# Patient Record
Sex: FEMALE | Race: WHITE | HISPANIC OR LATINO | Employment: FULL TIME | URBAN - NONMETROPOLITAN AREA
[De-identification: names, ages, dates, MRNs, and addresses within clinical notes are randomized per-mention and may not be internally consistent; named-entity substitution may affect disease eponyms.]

---

## 2022-09-18 ENCOUNTER — HOSPITAL ENCOUNTER (EMERGENCY)
Facility: HOSPITAL | Age: 47
Discharge: HOME | End: 2022-09-18
Attending: EMERGENCY MEDICINE | Admitting: EMERGENCY MEDICINE
Payer: COMMERCIAL

## 2022-09-18 VITALS
SYSTOLIC BLOOD PRESSURE: 149 MMHG | OXYGEN SATURATION: 98 % | WEIGHT: 157 LBS | HEART RATE: 112 BPM | TEMPERATURE: 97.8 F | RESPIRATION RATE: 20 BRPM | DIASTOLIC BLOOD PRESSURE: 73 MMHG | BODY MASS INDEX: 27.82 KG/M2 | HEIGHT: 63 IN

## 2022-09-18 DIAGNOSIS — B34.9 VIRAL SYNDROME: Primary | ICD-10-CM

## 2022-09-18 LAB
FLUAV RNA RESP QL NAA+PROBE: NEGATIVE
FLUBV RNA RESP QL NAA+PROBE: NEGATIVE
RSV RNA RESP QL NAA+PROBE: NEGATIVE
SARS-COV-2 RNA RESP QL NAA+PROBE: NEGATIVE

## 2022-09-18 PROCEDURE — 96372 THER/PROPH/DIAG INJ SC/IM: CPT

## 2022-09-18 PROCEDURE — 99283 EMERGENCY DEPT VISIT LOW MDM: CPT

## 2022-09-18 PROCEDURE — 99284 EMERGENCY DEPT VISIT MOD MDM: CPT | Performed by: PHYSICIAN ASSISTANT

## 2022-09-18 PROCEDURE — 0241U HB NFCT DS VIR RESP RNA 4 TRGT: CPT | Performed by: PHYSICIAN ASSISTANT

## 2022-09-18 RX ORDER — FLUTICASONE PROPIONATE 50 MCG
1 SPRAY, SUSPENSION (ML) NASAL DAILY
Status: DISCONTINUED | OUTPATIENT
Start: 2022-09-19 | End: 2022-09-18

## 2022-09-18 RX ORDER — FLUTICASONE PROPIONATE 50 MCG
1 SPRAY, SUSPENSION (ML) NASAL DAILY
Qty: 16 G | Refills: 0 | Status: SHIPPED | OUTPATIENT
Start: 2022-09-18

## 2022-09-18 RX ORDER — FLUTICASONE PROPIONATE 50 MCG
1 SPRAY, SUSPENSION (ML) NASAL DAILY
Status: DISCONTINUED | OUTPATIENT
Start: 2022-09-18 | End: 2022-09-18 | Stop reason: HOSPADM

## 2022-09-18 RX ORDER — ALBUTEROL SULFATE 90 UG/1
2 AEROSOL, METERED RESPIRATORY (INHALATION) ONCE
Status: COMPLETED | OUTPATIENT
Start: 2022-09-18 | End: 2022-09-18

## 2022-09-18 RX ORDER — ALBUTEROL SULFATE 90 UG/1
2 AEROSOL, METERED RESPIRATORY (INHALATION) EVERY 6 HOURS PRN
Qty: 8.5 G | Refills: 0 | Status: SHIPPED | OUTPATIENT
Start: 2022-09-18

## 2022-09-18 RX ORDER — METHYLPREDNISOLONE 4 MG/1
TABLET ORAL
Qty: 21 TABLET | Refills: 0 | Status: SHIPPED | OUTPATIENT
Start: 2022-09-18

## 2022-09-18 RX ORDER — DEXAMETHASONE SODIUM PHOSPHATE 4 MG/ML
10 INJECTION, SOLUTION INTRA-ARTICULAR; INTRALESIONAL; INTRAMUSCULAR; INTRAVENOUS; SOFT TISSUE ONCE
Status: COMPLETED | OUTPATIENT
Start: 2022-09-18 | End: 2022-09-18

## 2022-09-18 RX ADMIN — DEXAMETHASONE SODIUM PHOSPHATE 10 MG: 4 INJECTION, SOLUTION INTRAMUSCULAR; INTRAVENOUS at 19:24

## 2022-09-18 RX ADMIN — ALBUTEROL SULFATE 2 PUFF: 90 AEROSOL, METERED RESPIRATORY (INHALATION) at 19:23

## 2022-09-18 RX ADMIN — FLUTICASONE PROPIONATE 1 SPRAY: 50 SPRAY, METERED NASAL at 20:00

## 2022-09-18 NOTE — ED PROVIDER NOTES
History  Chief Complaint   Patient presents with    Flu Symptoms     Sore throat, runny nose and headache     The patient is a 64-year-old female who presents emergency department today with a chief complaint of sore throat, nasal congestion, rhinorrhea, headache, cough x3 days  The patient states that she has intermittent wheezing over the past year  Patient states that she has not taken anything prior to arrival   Patient did have COVID approximately 2 years ago  Patient concerned she may be ill again with COVID  Patient does no longer have her inhaler home this was prescribed with previous illnesses  Flu Symptoms  Presenting symptoms: cough and rhinorrhea    Cough:     Cough characteristics:  Non-productive    Sputum characteristics:  Nondescript    Onset quality:  Gradual    Timing:  Constant    Progression:  Worsening  Rhinorrhea:     Quality:  Clear    Severity:  Moderate    Timing:  Constant    Progression:  Worsening  Severity:  Moderate  Onset quality:  Gradual  Progression:  Worsening  Chronicity:  New  Relieved by:  None tried  Worsened by:  Nothing  Ineffective treatments:  None tried  Associated symptoms: chills and nasal congestion    Associated symptoms: no decrease in physical activity, no ear pain, no mental status change and no syncope        None       Past Medical History:   Diagnosis Date    Disease of thyroid gland        History reviewed  No pertinent surgical history  History reviewed  No pertinent family history  I have reviewed and agree with the history as documented  E-Cigarette/Vaping     E-Cigarette/Vaping Substances     Social History     Tobacco Use    Smoking status: Never Smoker    Smokeless tobacco: Never Used   Substance Use Topics    Alcohol use: Never    Drug use: Never       Review of Systems   Constitutional: Positive for chills  HENT: Positive for congestion and rhinorrhea  Negative for ear pain  Respiratory: Positive for cough      All other systems reviewed and are negative  Physical Exam  Physical Exam  Vitals and nursing note reviewed  Constitutional:       General: She is not in acute distress  Appearance: She is well-developed  HENT:      Head: Normocephalic and atraumatic  Right Ear: Ear canal and external ear normal  Tympanic membrane is bulging  Tympanic membrane is not perforated or erythematous  Left Ear: Ear canal and external ear normal  Tympanic membrane is bulging  Tympanic membrane is not perforated or erythematous  Nose: Congestion and rhinorrhea present  Mouth/Throat:      Mouth: Mucous membranes are moist    Eyes:      Extraocular Movements: Extraocular movements intact  Conjunctiva/sclera: Conjunctivae normal       Pupils: Pupils are equal, round, and reactive to light  Cardiovascular:      Rate and Rhythm: Normal rate and regular rhythm  Pulses: Normal pulses  Heart sounds: No murmur heard  Pulmonary:      Effort: Pulmonary effort is normal  No respiratory distress  Breath sounds: Normal breath sounds  Abdominal:      Palpations: Abdomen is soft  Tenderness: There is no abdominal tenderness  There is no right CVA tenderness or left CVA tenderness  Musculoskeletal:      Cervical back: Normal range of motion and neck supple  Right lower leg: No edema  Left lower leg: No edema  Skin:     General: Skin is warm and dry  Capillary Refill: Capillary refill takes less than 2 seconds  Neurological:      General: No focal deficit present  Mental Status: She is alert and oriented to person, place, and time  GCS: GCS eye subscore is 4  GCS verbal subscore is 5  GCS motor subscore is 6  Gait: Gait is intact           Vital Signs  ED Triage Vitals [09/18/22 1851]   Temperature Pulse Respirations Blood Pressure SpO2   97 8 °F (36 6 °C) (!) 112 20 149/73 98 %      Temp src Heart Rate Source Patient Position - Orthostatic VS BP Location FiO2 (%)   -- Monitor Sitting -- --      Pain Score       5           Vitals:    09/18/22 1851   BP: 149/73   Pulse: (!) 112   Patient Position - Orthostatic VS: Sitting         Visual Acuity      ED Medications  Medications   dexamethasone (DECADRON) injection 10 mg (10 mg Intramuscular Given 9/18/22 1924)   albuterol (PROVENTIL HFA,VENTOLIN HFA) inhaler 2 puff (2 puffs Inhalation Given 9/18/22 1923)       Diagnostic Studies  Results Reviewed     Procedure Component Value Units Date/Time    FLU/RSV/COVID - if FLU/RSV clinically relevant [[id number]] Collected: 09/18/22 1929    Lab Status: No result Specimen: Nares from Nose                  No orders to display              Procedures  Procedures         ED Course                               SBIRT 20yo+    Flowsheet Row Most Recent Value   SBIRT (23 yo +)    In order to provide better care to our patients, we are screening all of our patients for alcohol and drug use  Would it be okay to ask you these screening questions? Yes Filed at: 09/18/2022 2641   Initial Alcohol Screen: US AUDIT-C     1  How often do you have a drink containing alcohol? 0 Filed at: 09/18/2022 1852   2  How many drinks containing alcohol do you have on a typical day you are drinking? 0 Filed at: 09/18/2022 1852   3a  Male UNDER 65: How often do you have five or more drinks on one occasion? 0 Filed at: 09/18/2022 1852   3b  FEMALE Any Age, or MALE 65+: How often do you have 4 or more drinks on one occassion? 0 Filed at: 09/18/2022 1852   Audit-C Score 0 Filed at: 09/18/2022 2037   DEEPIKA: How many times in the past year have you    Used an illegal drug or used a prescription medication for non-medical reasons?  Never Filed at: 09/18/2022 4526                    MDM  Number of Diagnoses or Management Options     Amount and/or Complexity of Data Reviewed  Clinical lab tests: ordered  Decide to obtain previous medical records or to obtain history from someone other than the patient: yes  Review and summarize past medical records: yes  Independent visualization of images, tracings, or specimens: yes    Risk of Complications, Morbidity, and/or Mortality  Presenting problems: low  Diagnostic procedures: low  Management options: low    Patient Progress  Patient progress: stable      Disposition  Final diagnoses:   Viral syndrome     Time reflects when diagnosis was documented in both MDM as applicable and the Disposition within this note     Time User Action Codes Description Comment    9/18/2022  7:01 PM Sharan Seip Lynn [B34 9] Viral syndrome       ED Disposition     ED Disposition   Discharge    Condition   Stable    Date/Time   Sun Sep 18, 2022  7:01 PM    [address] discharge to home/self care  Follow-up Information    None         Patient's Medications   Discharge Prescriptions    ALBUTEROL (PROAIR HFA) 90 MCG/ACT INHALER    Inhale 2 puffs every 6 (six) hours as needed for wheezing       Start Date: 9/18/2022 End Date: --       Order Dose: 2 puffs       Quantity: 8 5 g    Refills: 0    FLUTICASONE (FLONASE) 50 MCG/ACT NASAL SPRAY    1 spray into each nostril daily       Start Date: 9/18/2022 End Date: --       Order Dose: 1 spray       Quantity: 16 g    Refills: 0    METHYLPREDNISOLONE 4 MG TABLET THERAPY PACK    Use as directed on package       Start Date: 9/18/2022 End Date: --       Order Dose: --       Quantity: 21 tablet    Refills: 0       No discharge procedures on file      PDMP Review     None          ED Provider  Electronically Signed by           Nahum Vance PA-C  09/18/22 1929

## 2024-06-26 ENCOUNTER — OFFICE VISIT (OUTPATIENT)
Age: 49
End: 2024-06-26
Payer: COMMERCIAL

## 2024-06-26 ENCOUNTER — PREP FOR PROCEDURE (OUTPATIENT)
Age: 49
End: 2024-06-26

## 2024-06-26 VITALS
HEART RATE: 102 BPM | OXYGEN SATURATION: 98 % | HEIGHT: 63 IN | SYSTOLIC BLOOD PRESSURE: 128 MMHG | WEIGHT: 161 LBS | DIASTOLIC BLOOD PRESSURE: 80 MMHG | BODY MASS INDEX: 28.53 KG/M2

## 2024-06-26 DIAGNOSIS — Z12.11 SCREENING FOR COLON CANCER: ICD-10-CM

## 2024-06-26 DIAGNOSIS — K21.9 GASTROESOPHAGEAL REFLUX DISEASE, UNSPECIFIED WHETHER ESOPHAGITIS PRESENT: Primary | ICD-10-CM

## 2024-06-26 PROCEDURE — 99204 OFFICE O/P NEW MOD 45 MIN: CPT | Performed by: PHYSICIAN ASSISTANT

## 2024-06-26 RX ORDER — PANTOPRAZOLE SODIUM 40 MG/1
40 TABLET, DELAYED RELEASE ORAL DAILY
Qty: 30 TABLET | Refills: 1 | Status: SHIPPED | OUTPATIENT
Start: 2024-06-26 | End: 2024-08-25

## 2024-06-26 NOTE — PROGRESS NOTES
[facility] - Outpatient Consultation  Patricia Reyes 48 y.o. female MRN: [id number]  Encounter: [id number]          ASSESSMENT AND PLAN:      1. Gastroesophageal reflux disease    Patient reports struggling with significant heartburn, reflux, and a globus sensation x several weeks.    Will plan for EGD to investigate.  Will begin a course of Pantoprazole 40mg po daily x 8 weeks for GERD.  GERD modifications reviewed.  Decrease coffee consumption.    2. Screening for colon cancer    Patient has never had a colonoscopy. No family history of colon cancer.  Will plan for colonoscopy at the same time as the EGD to investigate.     ______________________________________________________________________    HPI:  Patient is a pleasant 48 year old female with a PMH of hyperthyroidism, asthma who presents to the office for a gastrointestinal evaluation.  Patient reports struggling with significant heartburn, reflux, and a globus sensation x several weeks. No melena or rectal bleeding.  No frequent NSAIDs.  She does report significant coffee consumption and has been working on decreasing it. No alcohol.  No prior EGD or colonoscopy. No family history of stomach or colon cancer.  She reports recent thyroid nodules and is following up with Endocrinology.      REVIEW OF SYSTEMS:    CONSTITUTIONAL: Denies any fever, chills, rigors, and weight loss.  HEENT: No earache or tinnitus. Denies hearing loss or visual disturbances.  CARDIOVASCULAR: No chest pain or palpitations.   RESPIRATORY: Denies any cough, hemoptysis, shortness of breath or dyspnea on exertion.  GASTROINTESTINAL: As noted in the History of Present Illness.   GENITOURINARY: No problems with urination. Denies any hematuria or dysuria.  NEUROLOGIC: No dizziness or vertigo, denies headaches.   MUSCULOSKELETAL: Denies any muscle or joint pain.   SKIN: Denies skin rashes or itching.   ENDOCRINE: Denies excessive thirst. Denies intolerance to  "heat or cold.  PSYCHOSOCIAL: Denies depression or anxiety. Denies any recent memory loss.       Historical Information   Past Medical History:   Diagnosis Date    Disease of thyroid gland     Hyperthyroidism      Past Surgical History:   Procedure Laterality Date    BARIATRIC SURGERY      15 years ago    HYSTERECTOMY      2 years ago     Social History   Social History     Substance and Sexual Activity   Alcohol Use Never     Social History     Substance and Sexual Activity   Drug Use Never     Social History     Tobacco Use   Smoking Status Never   Smokeless Tobacco Never     Family History   Problem Relation Age of Onset    Arthritis Mother     Breast cancer Mother     Depression Mother     Hypertension Mother     Kidney disease Mother        Meds/Allergies       Current Outpatient Medications:     albuterol (ProAir HFA) 90 mcg/act inhaler    pantoprazole (PROTONIX) 40 mg tablet    No Known Allergies        Objective     Blood pressure 128/80, pulse 102, height 5' 3\" (1.6 m), weight 73 kg (161 lb), SpO2 98%. Body mass index is 28.52 kg/m².        PHYSICAL EXAM:      General Appearance:   Alert, cooperative, no distress   HEENT:   Normocephalic, atraumatic, anicteric.     Neck:  Supple, symmetrical, trachea midline   Lungs:   Clear to auscultation bilaterally; no rales, rhonchi or wheezing; respirations unlabored    Heart::   Regular rate and rhythm; no murmur, rub, or gallop.   Abdomen:   Soft, non-tender, non-distended; normal bowel sounds; no masses, no organomegaly    Genitalia:   Deferred    Rectal:   Deferred    Extremities:  No cyanosis, clubbing or edema    Pulses:  2+ and symmetric    Skin:  No jaundice, rashes, or lesions    Lymph nodes:  No palpable cervical lymphadenopathy        Lab Results:   No visits with results within 1 Day(s) from this visit.   Latest known visit with results is:   Admission on 09/18/2022, Discharged on 09/18/2022   Component Date Value    SARS-CoV-2 09/18/2022 Negative     " INFLUENZA A PCR 09/18/2022 Negative     INFLUENZA B PCR 09/18/2022 Negative     RSV PCR 09/18/2022 Negative          Radiology Results:   US thyroid    Result Date: 6/20/2024  Narrative: History: Abnormal thyroid function tests Ultrasound of the thyroid gland was performed Comparison Studies: 8/24/2021 Findings: Right thyroid lobe: 4.5 x 1.2 x 1.0 cm Left thyroid lobe: 4.3 x 1.1 x 1.3 cm Thyroid gland echotexture: Mildly heterogeneous Thyroid gland vascularity on color Doppler: Normal Nodules Right Lobe: Nodule 1 Right mid nodule measuring 0.5 x 0.3 x 0.4 cm Comparison: None Composition: Spongiform Echotexture: Isoechoic to hypoechoic Margin: Regular Echogenic foci: None BISI Category: Very low Nodules Left Lobe: Nodule 1 Left lower nodule measuring 0.7 x 0.3 x 0.5 cm Comparison: None Composition: Solid Echotexture: Hypoechoic Margin: Regular Echogenic foci: None BISI Category: Intermediate.   Nodule 1 Left mid to lower nodule measuring 0.7 x 0.3 x 0.5 cm Comparison: Probably present previously although not measured discretely. Composition: Solid hypoechoic Echotexture: Hypoechoic Margin: Regular Echogenic foci: None BISI Category: Intermediate Patchy nonuniform echotexture in the mid to lower pole the left lobe of the thyroid, with possible subtle 0.8 x 0.3 x 0.4 cm nodule versus heterogeneous tissue. Similar findings were present on the prior study, although a discrete nodule was not measured. Nodules Isthmus: None Other findings: None    Impression: IMPRESSION: Small right thyroid nodule, very low suspicion imaging pattern. Small left thyroid nodule, intermediate suspicion pattern. Patchy nonuniform echotexture in the left mid to lower pole, with subtle nodule versus heterogeneous tissue. Visually, this is similar to the prior study. Consider follow-up surveillance imaging in one year. Sonographic pattern and FNA recommendations are based on American Thyroid Association (BISI) guidelines for assessment of thyroid  nodules, as agreed upon by multidisciplinary departments at [facility]. Sonographic pattern Benign pattern (0% risk): no biopsy Very low suspicion pattern (<3% risk): biopsy if > or = 2 cm (or ultrasound observation) Low suspicion pattern (5-10% risk): biopsy if > or = 1.5 cm Intermediate suspicion pattern (10-20% risk): biopsy if > or = 1 cm High suspicion pattern (>70-90% risk): biopsy if > or = 1 cm (for nodules <1 cm, biopsy could be considered if technically feasible) Workstation:[id number]

## 2024-06-26 NOTE — PATIENT INSTRUCTIONS
Scheduled date of EGD/colonoscopy (as of today): 7/3/24  Physician performing EGD/colonoscopy: [place]  Location of EGD/colonoscopy: [facility]  Desired bowel prep reviewed with patient: Miralax  Instructions reviewed with patient by: Emilee CHOU  Clearances:

## 2024-06-26 NOTE — H&P (VIEW-ONLY)
[facility] - Outpatient Consultation  Patricia Reyes 48 y.o. female MRN: [id number]  Encounter: [id number]          ASSESSMENT AND PLAN:      1. Gastroesophageal reflux disease    Patient reports struggling with significant heartburn, reflux, and a globus sensation x several weeks.    Will plan for EGD to investigate.  Will begin a course of Pantoprazole 40mg po daily x 8 weeks for GERD.  GERD modifications reviewed.  Decrease coffee consumption.    2. Screening for colon cancer    Patient has never had a colonoscopy. No family history of colon cancer.  Will plan for colonoscopy at the same time as the EGD to investigate.     ______________________________________________________________________    HPI:  Patient is a pleasant 48 year old female with a PMH of hyperthyroidism, asthma who presents to the office for a gastrointestinal evaluation.  Patient reports struggling with significant heartburn, reflux, and a globus sensation x several weeks. No melena or rectal bleeding.  No frequent NSAIDs.  She does report significant coffee consumption and has been working on decreasing it. No alcohol.  No prior EGD or colonoscopy. No family history of stomach or colon cancer.  She reports recent thyroid nodules and is following up with Endocrinology.      REVIEW OF SYSTEMS:    CONSTITUTIONAL: Denies any fever, chills, rigors, and weight loss.  HEENT: No earache or tinnitus. Denies hearing loss or visual disturbances.  CARDIOVASCULAR: No chest pain or palpitations.   RESPIRATORY: Denies any cough, hemoptysis, shortness of breath or dyspnea on exertion.  GASTROINTESTINAL: As noted in the History of Present Illness.   GENITOURINARY: No problems with urination. Denies any hematuria or dysuria.  NEUROLOGIC: No dizziness or vertigo, denies headaches.   MUSCULOSKELETAL: Denies any muscle or joint pain.   SKIN: Denies skin rashes or itching.   ENDOCRINE: Denies excessive thirst. Denies intolerance to  "heat or cold.  PSYCHOSOCIAL: Denies depression or anxiety. Denies any recent memory loss.       Historical Information   Past Medical History:   Diagnosis Date    Disease of thyroid gland     Hyperthyroidism      Past Surgical History:   Procedure Laterality Date    BARIATRIC SURGERY      15 years ago    HYSTERECTOMY      2 years ago     Social History   Social History     Substance and Sexual Activity   Alcohol Use Never     Social History     Substance and Sexual Activity   Drug Use Never     Social History     Tobacco Use   Smoking Status Never   Smokeless Tobacco Never     Family History   Problem Relation Age of Onset    Arthritis Mother     Breast cancer Mother     Depression Mother     Hypertension Mother     Kidney disease Mother        Meds/Allergies       Current Outpatient Medications:     albuterol (ProAir HFA) 90 mcg/act inhaler    pantoprazole (PROTONIX) 40 mg tablet    No Known Allergies        Objective     Blood pressure 128/80, pulse 102, height 5' 3\" (1.6 m), weight 73 kg (161 lb), SpO2 98%. Body mass index is 28.52 kg/m².        PHYSICAL EXAM:      General Appearance:   Alert, cooperative, no distress   HEENT:   Normocephalic, atraumatic, anicteric.     Neck:  Supple, symmetrical, trachea midline   Lungs:   Clear to auscultation bilaterally; no rales, rhonchi or wheezing; respirations unlabored    Heart::   Regular rate and rhythm; no murmur, rub, or gallop.   Abdomen:   Soft, non-tender, non-distended; normal bowel sounds; no masses, no organomegaly    Genitalia:   Deferred    Rectal:   Deferred    Extremities:  No cyanosis, clubbing or edema    Pulses:  2+ and symmetric    Skin:  No jaundice, rashes, or lesions    Lymph nodes:  No palpable cervical lymphadenopathy        Lab Results:   No visits with results within 1 Day(s) from this visit.   Latest known visit with results is:   Admission on 09/18/2022, Discharged on 09/18/2022   Component Date Value    SARS-CoV-2 09/18/2022 Negative     " INFLUENZA A PCR 09/18/2022 Negative     INFLUENZA B PCR 09/18/2022 Negative     RSV PCR 09/18/2022 Negative          Radiology Results:   US thyroid    Result Date: 6/20/2024  Narrative: History: Abnormal thyroid function tests Ultrasound of the thyroid gland was performed Comparison Studies: 8/24/2021 Findings: Right thyroid lobe: 4.5 x 1.2 x 1.0 cm Left thyroid lobe: 4.3 x 1.1 x 1.3 cm Thyroid gland echotexture: Mildly heterogeneous Thyroid gland vascularity on color Doppler: Normal Nodules Right Lobe: Nodule 1 Right mid nodule measuring 0.5 x 0.3 x 0.4 cm Comparison: None Composition: Spongiform Echotexture: Isoechoic to hypoechoic Margin: Regular Echogenic foci: None BISI Category: Very low Nodules Left Lobe: Nodule 1 Left lower nodule measuring 0.7 x 0.3 x 0.5 cm Comparison: None Composition: Solid Echotexture: Hypoechoic Margin: Regular Echogenic foci: None BISI Category: Intermediate.   Nodule 1 Left mid to lower nodule measuring 0.7 x 0.3 x 0.5 cm Comparison: Probably present previously although not measured discretely. Composition: Solid hypoechoic Echotexture: Hypoechoic Margin: Regular Echogenic foci: None BISI Category: Intermediate Patchy nonuniform echotexture in the mid to lower pole the left lobe of the thyroid, with possible subtle 0.8 x 0.3 x 0.4 cm nodule versus heterogeneous tissue. Similar findings were present on the prior study, although a discrete nodule was not measured. Nodules Isthmus: None Other findings: None    Impression: IMPRESSION: Small right thyroid nodule, very low suspicion imaging pattern. Small left thyroid nodule, intermediate suspicion pattern. Patchy nonuniform echotexture in the left mid to lower pole, with subtle nodule versus heterogeneous tissue. Visually, this is similar to the prior study. Consider follow-up surveillance imaging in one year. Sonographic pattern and FNA recommendations are based on American Thyroid Association (BISI) guidelines for assessment of thyroid  nodules, as agreed upon by multidisciplinary departments at [facility]. Sonographic pattern Benign pattern (0% risk): no biopsy Very low suspicion pattern (<3% risk): biopsy if > or = 2 cm (or ultrasound observation) Low suspicion pattern (5-10% risk): biopsy if > or = 1.5 cm Intermediate suspicion pattern (10-20% risk): biopsy if > or = 1 cm High suspicion pattern (>70-90% risk): biopsy if > or = 1 cm (for nodules <1 cm, biopsy could be considered if technically feasible) Workstation:[id number]

## 2024-07-03 ENCOUNTER — ANESTHESIA EVENT (OUTPATIENT)
Dept: GASTROENTEROLOGY | Facility: HOSPITAL | Age: 49
End: 2024-07-03

## 2024-07-03 ENCOUNTER — HOSPITAL ENCOUNTER (OUTPATIENT)
Dept: GASTROENTEROLOGY | Facility: HOSPITAL | Age: 49
Setting detail: OUTPATIENT SURGERY
Discharge: HOME | End: 2024-07-03
Payer: COMMERCIAL

## 2024-07-03 ENCOUNTER — ANESTHESIA (OUTPATIENT)
Dept: GASTROENTEROLOGY | Facility: HOSPITAL | Age: 49
End: 2024-07-03

## 2024-07-03 VITALS
HEIGHT: 63 IN | TEMPERATURE: 97.7 F | SYSTOLIC BLOOD PRESSURE: 105 MMHG | OXYGEN SATURATION: 99 % | RESPIRATION RATE: 16 BRPM | HEART RATE: 80 BPM | WEIGHT: 154.32 LBS | DIASTOLIC BLOOD PRESSURE: 57 MMHG | BODY MASS INDEX: 27.34 KG/M2

## 2024-07-03 DIAGNOSIS — K21.9 GASTROESOPHAGEAL REFLUX DISEASE, UNSPECIFIED WHETHER ESOPHAGITIS PRESENT: ICD-10-CM

## 2024-07-03 DIAGNOSIS — Z12.11 SCREENING FOR COLON CANCER: ICD-10-CM

## 2024-07-03 PROCEDURE — 43235 EGD DIAGNOSTIC BRUSH WASH: CPT | Performed by: INTERNAL MEDICINE

## 2024-07-03 PROCEDURE — 88305 TISSUE EXAM BY PATHOLOGIST: CPT | Performed by: PATHOLOGY

## 2024-07-03 PROCEDURE — 45385 COLONOSCOPY W/LESION REMOVAL: CPT | Performed by: INTERNAL MEDICINE

## 2024-07-03 RX ORDER — SODIUM CHLORIDE, SODIUM LACTATE, POTASSIUM CHLORIDE, CALCIUM CHLORIDE 600; 310; 30; 20 MG/100ML; MG/100ML; MG/100ML; MG/100ML
INJECTION, SOLUTION INTRAVENOUS CONTINUOUS PRN
Status: DISCONTINUED | OUTPATIENT
Start: 2024-07-03 | End: 2024-07-03

## 2024-07-03 RX ORDER — LIDOCAINE HYDROCHLORIDE 20 MG/ML
INJECTION, SOLUTION EPIDURAL; INFILTRATION; INTRACAUDAL; PERINEURAL AS NEEDED
Status: DISCONTINUED | OUTPATIENT
Start: 2024-07-03 | End: 2024-07-03

## 2024-07-03 RX ORDER — PROPOFOL 10 MG/ML
INJECTION, EMULSION INTRAVENOUS AS NEEDED
Status: DISCONTINUED | OUTPATIENT
Start: 2024-07-03 | End: 2024-07-03

## 2024-07-03 RX ADMIN — PROPOFOL 50 MG: 10 INJECTION, EMULSION INTRAVENOUS at 09:09

## 2024-07-03 RX ADMIN — SODIUM CHLORIDE, SODIUM LACTATE, POTASSIUM CHLORIDE, AND CALCIUM CHLORIDE: .6; .31; .03; .02 INJECTION, SOLUTION INTRAVENOUS at 08:52

## 2024-07-03 RX ADMIN — LIDOCAINE HYDROCHLORIDE 100 MG: 20 INJECTION, SOLUTION EPIDURAL; INFILTRATION; INTRACAUDAL; PERINEURAL at 08:58

## 2024-07-03 RX ADMIN — PROPOFOL 50 MG: 10 INJECTION, EMULSION INTRAVENOUS at 08:59

## 2024-07-03 RX ADMIN — PROPOFOL 100 MG: 10 INJECTION, EMULSION INTRAVENOUS at 08:58

## 2024-07-03 RX ADMIN — PROPOFOL 30 MG: 10 INJECTION, EMULSION INTRAVENOUS at 09:03

## 2024-07-03 NOTE — ANESTHESIA POSTPROCEDURE EVALUATION
Post-Op Assessment Note    CV Status:  Stable  Pain Score: 0    Pain management: adequate       Mental Status:  Sleepy and arousable   Hydration Status:  Euvolemic   PONV Controlled:  None   Airway Patency:  Patent     Post Op Vitals Reviewed: Yes    No anethesia notable event occurred.    Staff: MALA               BP 97/55 (07/03/24 0918)    Temp 97.7 °F (36.5 °C) (07/03/24 0918)    Pulse 75 (07/03/24 0918)   Resp 16 (07/03/24 0918)    SpO2 100 % (07/03/24 0918)

## 2024-07-03 NOTE — ANESTHESIA PREPROCEDURE EVALUATION
Procedure:  COLONOSCOPY  EGD  Fell this am when getting up to go to the bathroom, patient states she got light headed with suddenly getting up, fell and hit her elbow. Denies hitting her head. Not on blood thinners. Feels like her normal self at this time. No focal neuro deficits on exam.  No cardiac hx. HD stable. Likely occurred in the setting of bowel prep and NPO - will proceed.    Hypothyroid  Asthma - uses inhaler last week. No ED visits for resp distress  2015 bariatric surgery - sleeve  Denies hx of anesthesia complications    High ft4 5/22/24 - +thyroid nodules, feels a lump, sometimes difficulty swallowing    Relevant Problems   No relevant active problems        Physical Exam    Airway    Mallampati score: I  TM Distance: >3 FB  Neck ROM: full     Dental   No notable dental hx     Cardiovascular  Cardiovascular exam normal    Pulmonary  Pulmonary exam normal     Other Findings  post-pubertal.      Anesthesia Plan  ASA Score- 2     Anesthesia Type- IV sedation with anesthesia with ASA Monitors.         Additional Monitors:     Airway Plan:            Plan Factors-Exercise tolerance (METS): >4 METS.    Chart reviewed.   Existing labs reviewed. Patient summary reviewed.    Patient is not a current smoker.              Induction- intravenous.    Postoperative Plan-         Informed Consent- Anesthetic plan and risks discussed with patient.  I personally reviewed this patient with the CRNA. Discussed and agreed on the Anesthesia Plan with the CRNA..

## 2024-07-03 NOTE — INTERVAL H&P NOTE
H&P reviewed. After examining the patient I find no changes in the patients condition since the H&P had been written.    Vitals:    07/03/24 0738   BP: 129/72   Pulse: 77   Resp: 16   Temp: 97.6 °F (36.4 °C)   SpO2: 98%

## 2024-07-10 ENCOUNTER — OFFICE VISIT (OUTPATIENT)
Age: 49
End: 2024-07-10
Payer: COMMERCIAL

## 2024-07-10 VITALS
OXYGEN SATURATION: 96 % | DIASTOLIC BLOOD PRESSURE: 80 MMHG | WEIGHT: 154 LBS | SYSTOLIC BLOOD PRESSURE: 128 MMHG | BODY MASS INDEX: 27.29 KG/M2 | HEIGHT: 63 IN | HEART RATE: 88 BPM

## 2024-07-10 DIAGNOSIS — K21.9 GASTROESOPHAGEAL REFLUX DISEASE, UNSPECIFIED WHETHER ESOPHAGITIS PRESENT: ICD-10-CM

## 2024-07-10 DIAGNOSIS — K21.00 GASTROESOPHAGEAL REFLUX DISEASE WITH ESOPHAGITIS WITHOUT HEMORRHAGE: Primary | ICD-10-CM

## 2024-07-10 PROCEDURE — 99213 OFFICE O/P EST LOW 20 MIN: CPT | Performed by: PHYSICIAN ASSISTANT

## 2024-07-10 RX ORDER — PANTOPRAZOLE SODIUM 40 MG/1
40 TABLET, DELAYED RELEASE ORAL DAILY
Qty: 30 TABLET | Refills: 1 | Status: SHIPPED | OUTPATIENT
Start: 2024-07-10 | End: 2024-09-08

## 2024-07-10 NOTE — PROGRESS NOTES
[facility] - Outpatient Follow-up Note  Patricia Reyes 48 y.o. female MRN: [id number]  Encounter: [id number]          ASSESSMENT AND PLAN:      1. Gastroesophageal reflux disease with esophagitis without hemorrhage    Patient presents for follow up.  She underwent an EGD 7/3 which showed grade B esophagitis and a small sliding hiatal hernia.  She underwent a colonoscopy as well which showed 1 smaller than 5mm adenoma that was removed.  Her symptoms have improved on the Pantoprazole course.    She will continue Pantoprazole 40mg po daily to complete 12 weeks of treatment and then she can come off the medication.  GERD modifications reviewed. Avoidance of GERD trigger foods and avoidance or reclining for at least 3 hours after eating.  Repeat colonoscopy in 7 years.    ______________________________________________________________________    SUBJECTIVE:  Patient is a pleasant 48 year old female who presents to the office for follow up.  Sky heartburn, reflux, and globus sensation has improved on the Pantoprazole.  She feels much better!  She underwent and EGD and colonoscopy as noted above.  No smoking.       REVIEW OF SYSTEMS IS OTHERWISE NEGATIVE.      Historical Information   Past Medical History:   Diagnosis Date    Asthma     post covid    Disease of thyroid gland     Hyperthyroidism      Past Surgical History:   Procedure Laterality Date    BARIATRIC SURGERY      15 years ago    HYSTERECTOMY      2 years ago     Social History   Social History     Substance and Sexual Activity   Alcohol Use Never     Social History     Substance and Sexual Activity   Drug Use Never     Social History     Tobacco Use   Smoking Status Never   Smokeless Tobacco Never     Family History   Problem Relation Age of Onset    Arthritis Mother     Breast cancer Mother     Depression Mother     Hypertension Mother     Kidney disease Mother        Meds/Allergies       Current Outpatient Medications:      "albuterol (ProAir HFA) 90 mcg/act inhaler    pantoprazole (PROTONIX) 40 mg tablet    No Known Allergies        Objective     Height 5' 3\" (1.6 m), weight 69.9 kg (154 lb). Body mass index is 27.28 kg/m².      PHYSICAL EXAM:      General Appearance:   Alert, cooperative, no distress   HEENT:   Normocephalic, atraumatic, anicteric.     Neck:  Supple, symmetrical, trachea midline   Lungs:   Clear to auscultation bilaterally; no rales, rhonchi or wheezing; respirations unlabored    Heart::   Regular rate and rhythm; no murmur, rub, or gallop.   Abdomen:   Soft, non-tender, non-distended; normal bowel sounds; no masses, no organomegaly    Genitalia:   Deferred    Rectal:   Deferred    Extremities:  No cyanosis, clubbing or edema    Pulses:  2+ and symmetric    Skin:  No jaundice, rashes, or lesions    Lymph nodes:  No palpable cervical lymphadenopathy        Lab Results:   No visits with results within 1 Day(s) from this visit.   Latest known visit with results is:   Hospital Outpatient Visit on 07/03/2024   Component Date Value    Case Report 07/03/2024                      Value:Surgical Pathology Report                         Case: [id number]                                  Authorizing Provider:  Ginny Bryson MD           Collected:           07/03/2024 0915              Ordering Location:      [facility]       Received:            07/03/2024 [address] [place] [address] Endoscopy                                                             Pathologist:           Shin Tam MD                                                       Specimen:    Polyp, Colorectal, transverse                                                              Final Diagnosis 07/03/2024                      Value:A. Polyp, Colorectal, transverse:  -Tubular adenoma  -No evidence of high grade dysplasia or malignancy seen.           Note 07/03/2024                      Value:Interpretation performed at " "[facility], [address], [facility] [address]        Additional Information 07/03/2024                      Value:All reported additional testing was performed with appropriately reactive controls.  These tests were developed and their performance characteristics determined by [facility] or appropriate performing facility, though some tests may be performed on tissues which have not been validated for performance characteristics (such as staining performed on alcohol exposed cell blocks and decalcified tissues).  Results should be interpreted with caution and in the context of the patients’ clinical condition. These tests may not be cleared or approved by the U.S. Food and Drug Administration, though the FDA has determined that such clearance or approval is not necessary. These tests are used for clinical purposes and they should not be regarded as investigational or for research. This laboratory has been approved by CLIA 88, designated as a high-complexity laboratory and is qualified to perform these tests.  .      Synoptic Checklist 07/03/2024                      Value:                            COLON/RECTUM POLYP FORM - GI - All Specimens                                                                                     :    Adenoma(s)      Gross Description 07/03/2024                      Value:A. The specimen is received in formalin, labeled with the patient's name and hospital number, and is designated \" polyp, colorectal-transverse\".  The specimen consists of a single tan soft tissue fragment measuring 0.5 cm in greatest dimension.  Entirely submitted. Screened cassette.    Note: The estimated total formalin fixation time based upon information provided by the submitting clinician and the standard processing schedule is under 72 hours.    -Nasim      Clinical Information 07/03/2024                      Value:Cold snare polypectomy         Radiology " Results:   Colonoscopy    Result Date: 7/3/2024  Narrative: Table formatting from the original result was not included.  Pending sale to [facility] [address] [facility] [place] [address] [phone] DATE OF SERVICE: 7/03/24 PHYSICIAN(S): Attending: Ginny Bryson MD Fellow: No Staff Documented INDICATION: Screening for colon cancer POST-OP DIAGNOSIS: See the impression below. HISTORY: Prior colonoscopy: No prior colonoscopy. BOWEL PREPARATION: Miralax/Dulcolax PREPROCEDURE: Informed consent was obtained for the procedure, including sedation. Risks including but not limited to bleeding, infection, perforation, adverse drug reaction and aspiration were explained in detail. Also explained about less than 100% sensitivity with the exam and other alternatives. The patient was placed in the left lateral decubitus position. Procedure: Colonoscopy DETAILS OF PROCEDURE: Patient was taken to the procedure room where a time out was performed to confirm correct patient and correct procedure. The patient underwent monitored anesthesia care, which was administered by an anesthesia professional. The patient's blood pressure, ECG, ETCO2, heart rate, level of consciousness, oxygen and respirations were monitored throughout the procedure. A digital rectal exam was performed. The scope was introduced through the anus and advanced to the cecum. Retroflexion was performed in the rectum. The quality of bowel preparation was evaluated using the [place] Bowel Preparation Scale with scores of: right colon = 2, transverse colon = 2, left colon = 2. The total BBPS score was 6. Bowel prep was adequate. The patient experienced no blood loss. The procedure was not difficult. The patient tolerated the procedure well. There were no apparent adverse events. ANESTHESIA INFORMATION: ASA: II Anesthesia Type: IV Sedation with Anesthesia MEDICATIONS: No administrations occurring from 0852 to 0916 on 07/03/24 FINDINGS: One sessile polyp  measuring smaller than 5 mm in the transverse colon; performed cold snare removal The cecum, ascending colon, hepatic flexure, descending colon, sigmoid colon, rectosigmoid and rectum appeared normal. EVENTS: Procedure Events Event Event Time ENDO CECUM REACHED 7/3/2024  9:05 AM ENDO SCOPE OUT TIME 7/3/2024  9:15 AM SPECIMENS: ID Type Source Tests Collected by Time Destination 1 : transverse Tissue Polyp, Colorectal TISSUE EXAM Ginny Bryson MD 7/3/2024  9:15 AM  EQUIPMENT: Colonoscope -[id number] ENDOCUFF VISION LRG GREEN ID [id number]     Impression: Subcentimeter polyp in the transverse colon was removed with cold snare The cecum, ascending colon, hepatic flexure, descending colon, sigmoid colon, rectosigmoid and rectum appeared normal. RECOMMENDATION: Repeat colonoscopy in 7 years, due: 7/2/2031 Personal history of colon polyps    Ginny Bryson MD     EGD    Result Date: 7/3/2024  Narrative: Table formatting from the original result was not included.  [facility] [address] [address] [phone] DATE OF SERVICE: 7/03/24 PHYSICIAN(S): Attending: Ginny Bryson MD Fellow: No Staff Documented INDICATION: Gastroesophageal reflux disease, unspecified whether esophagitis present POST-OP DIAGNOSIS: See the impression below. PREPROCEDURE: Informed consent was obtained for the procedure, including sedation.  Risks of perforation, hemorrhage, adverse drug reaction and aspiration were discussed. The patient was placed in the left lateral decubitus position. Patient was explained about the risks and benefits of the procedure. Risks including but not limited to bleeding, infection, and perforation were explained in detail. Also explained about less than 100% sensitivity with the exam and other alternatives. PROCEDURE: EGD DETAILS OF PROCEDURE: Patient was taken to the procedure room where a time out was performed to confirm correct patient and correct procedure. The patient underwent  monitored anesthesia care, which was administered by an anesthesia professional. The patient's blood pressure, heart rate, level of consciousness, respirations, oxygen, ECG and ETCO2 were monitored throughout the procedure. The scope was introduced through the mouth and advanced to the second part of the duodenum. Retroflexion was performed in the fundus. The patient experienced no blood loss. The procedure was not difficult. The patient tolerated the procedure well. There were no apparent adverse events. ANESTHESIA INFORMATION: ASA: II Anesthesia Type: IV Sedation with Anesthesia MEDICATIONS: No administrations occurring from 0852 to 0901 on 07/03/24 FINDINGS: Mild grade B esophagitis with mucosal breaks measuring 5 mm or more not continuous between folds, covering less than 75% of the circumference appearing edematous and erythematous in the lower third of the esophagus Small sliding hiatal hernia (type I hiatal hernia) - GE junction 36 cm from the incisors, diaphragmatic impression 38 cm from the incisors The 1st part of the duodenum and 2nd part of the duodenum appeared normal. SPECIMENS: * No specimens in log *     Impression: Grade B esophagitis in the lower third of the esophagus Small type I hiatal hernia The 1st part of the duodenum and 2nd part of the duodenum appeared normal. RECOMMENDATION: Await pathology results Proceed with colonoscopy   Ginny Bryson MD     US thyroid    Result Date: 6/20/2024  Narrative: History: Abnormal thyroid function tests Ultrasound of the thyroid gland was performed Comparison Studies: 8/24/2021 Findings: Right thyroid lobe: 4.5 x 1.2 x 1.0 cm Left thyroid lobe: 4.3 x 1.1 x 1.3 cm Thyroid gland echotexture: Mildly heterogeneous Thyroid gland vascularity on color Doppler: Normal Nodules Right Lobe: Nodule 1 Right mid nodule measuring 0.5 x 0.3 x 0.4 cm Comparison: None Composition: Spongiform Echotexture: Isoechoic to hypoechoic Margin: Regular Echogenic foci: None BISI  Category: Very low Nodules Left Lobe: Nodule 1 Left lower nodule measuring 0.7 x 0.3 x 0.5 cm Comparison: None Composition: Solid Echotexture: Hypoechoic Margin: Regular Echogenic foci: None BISI Category: Intermediate.   Nodule 1 Left mid to lower nodule measuring 0.7 x 0.3 x 0.5 cm Comparison: Probably present previously although not measured discretely. Composition: Solid hypoechoic Echotexture: Hypoechoic Margin: Regular Echogenic foci: None BISI Category: Intermediate Patchy nonuniform echotexture in the mid to lower pole the left lobe of the thyroid, with possible subtle 0.8 x 0.3 x 0.4 cm nodule versus heterogeneous tissue. Similar findings were present on the prior study, although a discrete nodule was not measured. Nodules Isthmus: None Other findings: None    Impression: IMPRESSION: Small right thyroid nodule, very low suspicion imaging pattern. Small left thyroid nodule, intermediate suspicion pattern. Patchy nonuniform echotexture in the left mid to lower pole, with subtle nodule versus heterogeneous tissue. Visually, this is similar to the prior study. Consider follow-up surveillance imaging in one year. Sonographic pattern and FNA recommendations are based on American Thyroid Association (BISI) guidelines for assessment of thyroid nodules, as agreed upon by multidisciplinary departments at [facility]. Sonographic pattern Benign pattern (0% risk): no biopsy Very low suspicion pattern (<3% risk): biopsy if > or = 2 cm (or ultrasound observation) Low suspicion pattern (5-10% risk): biopsy if > or = 1.5 cm Intermediate suspicion pattern (10-20% risk): biopsy if > or = 1 cm High suspicion pattern (>70-90% risk): biopsy if > or = 1 cm (for nodules <1 cm, biopsy could be considered if technically feasible) Workstation:[id number]

## 2024-08-14 DIAGNOSIS — Z00.6 ENCOUNTER FOR EXAMINATION FOR NORMAL COMPARISON OR CONTROL IN CLINICAL RESEARCH PROGRAM: ICD-10-CM

## 2024-08-28 ENCOUNTER — APPOINTMENT (OUTPATIENT)
Dept: LAB | Facility: CLINIC | Age: 49
End: 2024-08-28

## 2024-08-28 DIAGNOSIS — Z00.6 ENCOUNTER FOR EXAMINATION FOR NORMAL COMPARISON OR CONTROL IN CLINICAL RESEARCH PROGRAM: ICD-10-CM

## 2024-08-28 PROCEDURE — 36415 COLL VENOUS BLD VENIPUNCTURE: CPT

## 2024-09-10 LAB
APOB+LDLR+PCSK9 GENE MUT ANL BLD/T: NOT DETECTED
BRCA1+BRCA2 DEL+DUP + FULL MUT ANL BLD/T: NOT DETECTED
MLH1+MSH2+MSH6+PMS2 GN DEL+DUP+FUL M: NOT DETECTED

## 2024-10-07 DIAGNOSIS — K21.9 GASTROESOPHAGEAL REFLUX DISEASE, UNSPECIFIED WHETHER ESOPHAGITIS PRESENT: ICD-10-CM

## 2024-10-07 RX ORDER — PANTOPRAZOLE SODIUM 40 MG/1
40 TABLET, DELAYED RELEASE ORAL DAILY
Qty: 90 TABLET | Refills: 1 | Status: SHIPPED | OUTPATIENT
Start: 2024-10-07 | End: 2025-04-05